# Patient Record
Sex: MALE | Race: WHITE | NOT HISPANIC OR LATINO | Employment: OTHER | URBAN - NONMETROPOLITAN AREA
[De-identification: names, ages, dates, MRNs, and addresses within clinical notes are randomized per-mention and may not be internally consistent; named-entity substitution may affect disease eponyms.]

---

## 2023-08-17 PROBLEM — E55.9 VITAMIN D DEFICIENCY: Status: ACTIVE | Noted: 2023-08-17

## 2023-08-17 PROBLEM — J01.00 ACUTE MAXILLARY SINUSITIS: Status: ACTIVE | Noted: 2023-08-17

## 2023-08-17 PROBLEM — I25.10 ARTERIOSCLEROTIC CARDIOVASCULAR DISEASE (ASCVD): Status: ACTIVE | Noted: 2023-08-17

## 2023-08-17 PROBLEM — N63.20 MASS OF LEFT BREAST: Status: ACTIVE | Noted: 2023-08-17

## 2023-08-17 PROBLEM — R53.83 FATIGUE: Status: ACTIVE | Noted: 2023-08-17

## 2023-08-17 PROBLEM — D24.2 BENIGN NEOPLASM BREAST, LEFT: Status: ACTIVE | Noted: 2023-08-17

## 2023-08-17 PROBLEM — R25.1 TREMOR: Status: ACTIVE | Noted: 2023-08-17

## 2023-08-17 PROBLEM — R22.1 MASS OF NECK: Status: ACTIVE | Noted: 2023-08-17

## 2023-08-17 PROBLEM — N52.9 ERECTILE DYSFUNCTION: Status: ACTIVE | Noted: 2023-08-17

## 2023-08-17 PROBLEM — D17.9 LIPOMA: Status: ACTIVE | Noted: 2023-08-17

## 2023-08-17 PROBLEM — K21.9 GERD (GASTROESOPHAGEAL REFLUX DISEASE): Status: ACTIVE | Noted: 2023-08-17

## 2023-08-17 PROBLEM — E66.3 OVER WEIGHT: Status: ACTIVE | Noted: 2023-08-17

## 2023-08-17 PROBLEM — E04.1 THYROID NODULE: Status: ACTIVE | Noted: 2023-08-17

## 2023-08-17 PROBLEM — R42 DIZZINESS: Status: ACTIVE | Noted: 2023-08-17

## 2023-08-17 PROBLEM — J02.9 SORE THROAT: Status: ACTIVE | Noted: 2023-08-17

## 2023-08-17 PROBLEM — I10 HYPERTENSION: Status: ACTIVE | Noted: 2023-08-17

## 2023-08-17 PROBLEM — E78.5 DYSLIPIDEMIA: Status: ACTIVE | Noted: 2023-08-17

## 2023-08-17 RX ORDER — ACETAMINOPHEN AND PHENYLEPHRINE HCL 325; 5 MG/1; MG/1
TABLET ORAL
COMMUNITY
Start: 2022-02-17 | End: 2023-08-28 | Stop reason: SDUPTHER

## 2023-08-17 RX ORDER — RIZATRIPTAN BENZOATE 10 MG/1
TABLET ORAL
COMMUNITY
Start: 2022-10-04

## 2023-08-17 RX ORDER — ERGOCALCIFEROL 1.25 MG/1
1 CAPSULE ORAL
COMMUNITY
Start: 2022-02-17

## 2023-08-17 RX ORDER — EVOLOCUMAB 140 MG/ML
INJECTION, SOLUTION SUBCUTANEOUS
COMMUNITY
Start: 2020-02-25

## 2023-08-17 RX ORDER — MECLIZINE HCL 12.5 MG 12.5 MG/1
1 TABLET ORAL 3 TIMES DAILY PRN
COMMUNITY
Start: 2022-08-15 | End: 2023-08-28 | Stop reason: SDUPTHER

## 2023-08-17 RX ORDER — ATORVASTATIN CALCIUM 40 MG/1
1 TABLET, FILM COATED ORAL NIGHTLY
COMMUNITY

## 2023-08-17 RX ORDER — LISINOPRIL AND HYDROCHLOROTHIAZIDE 20; 25 MG/1; MG/1
1 TABLET ORAL DAILY
COMMUNITY
End: 2023-08-28 | Stop reason: SDUPTHER

## 2023-08-17 RX ORDER — TADALAFIL 20 MG/1
TABLET ORAL
COMMUNITY
Start: 2020-01-02

## 2023-08-17 RX ORDER — ESOMEPRAZOLE MAGNESIUM 40 MG/1
1 CAPSULE, DELAYED RELEASE ORAL DAILY
COMMUNITY
End: 2023-08-28 | Stop reason: SDUPTHER

## 2023-08-28 ENCOUNTER — OFFICE VISIT (OUTPATIENT)
Dept: PRIMARY CARE | Facility: CLINIC | Age: 68
End: 2023-08-28
Payer: MEDICARE

## 2023-08-28 VITALS
SYSTOLIC BLOOD PRESSURE: 167 MMHG | BODY MASS INDEX: 27.77 KG/M2 | HEIGHT: 70 IN | WEIGHT: 194 LBS | HEART RATE: 74 BPM | DIASTOLIC BLOOD PRESSURE: 95 MMHG | OXYGEN SATURATION: 96 %

## 2023-08-28 DIAGNOSIS — E78.5 HYPERLIPIDEMIA, UNSPECIFIED HYPERLIPIDEMIA TYPE: ICD-10-CM

## 2023-08-28 DIAGNOSIS — N63.20 MASS OF LEFT BREAST, UNSPECIFIED QUADRANT: ICD-10-CM

## 2023-08-28 DIAGNOSIS — Z00.00 MEDICARE ANNUAL WELLNESS VISIT, SUBSEQUENT: Primary | ICD-10-CM

## 2023-08-28 DIAGNOSIS — K21.00 GASTROESOPHAGEAL REFLUX DISEASE WITH ESOPHAGITIS, UNSPECIFIED WHETHER HEMORRHAGE: ICD-10-CM

## 2023-08-28 DIAGNOSIS — I10 HYPERTENSION, UNSPECIFIED TYPE: ICD-10-CM

## 2023-08-28 DIAGNOSIS — R42 DIZZINESS: ICD-10-CM

## 2023-08-28 DIAGNOSIS — Z00.00 HEALTHCARE MAINTENANCE: ICD-10-CM

## 2023-08-28 DIAGNOSIS — G47.9 SLEEP DISTURBANCE: ICD-10-CM

## 2023-08-28 DIAGNOSIS — R20.0 RIGHT ARM NUMBNESS: ICD-10-CM

## 2023-08-28 DIAGNOSIS — D64.9 ANEMIA, UNSPECIFIED TYPE: ICD-10-CM

## 2023-08-28 DIAGNOSIS — R53.83 FATIGUE, UNSPECIFIED TYPE: ICD-10-CM

## 2023-08-28 DIAGNOSIS — E78.5 DYSLIPIDEMIA: ICD-10-CM

## 2023-08-28 DIAGNOSIS — D17.9 LIPOMA, UNSPECIFIED SITE: ICD-10-CM

## 2023-08-28 PROCEDURE — 99214 OFFICE O/P EST MOD 30 MIN: CPT | Performed by: INTERNAL MEDICINE

## 2023-08-28 PROCEDURE — 3080F DIAST BP >= 90 MM HG: CPT | Performed by: INTERNAL MEDICINE

## 2023-08-28 PROCEDURE — G0439 PPPS, SUBSEQ VISIT: HCPCS | Performed by: INTERNAL MEDICINE

## 2023-08-28 PROCEDURE — 1036F TOBACCO NON-USER: CPT | Performed by: INTERNAL MEDICINE

## 2023-08-28 PROCEDURE — 1159F MED LIST DOCD IN RCRD: CPT | Performed by: INTERNAL MEDICINE

## 2023-08-28 PROCEDURE — 3077F SYST BP >= 140 MM HG: CPT | Performed by: INTERNAL MEDICINE

## 2023-08-28 PROCEDURE — 1170F FXNL STATUS ASSESSED: CPT | Performed by: INTERNAL MEDICINE

## 2023-08-28 PROCEDURE — 1160F RVW MEDS BY RX/DR IN RCRD: CPT | Performed by: INTERNAL MEDICINE

## 2023-08-28 RX ORDER — ESOMEPRAZOLE MAGNESIUM 40 MG/1
40 CAPSULE, DELAYED RELEASE ORAL DAILY
Qty: 90 CAPSULE | Refills: 1 | Status: SHIPPED | OUTPATIENT
Start: 2023-08-28 | End: 2024-02-19

## 2023-08-28 RX ORDER — ACETAMINOPHEN AND PHENYLEPHRINE HCL 325; 5 MG/1; MG/1
1 TABLET ORAL DAILY
Qty: 90 TABLET | Refills: 1 | Status: SHIPPED | OUTPATIENT
Start: 2023-08-28

## 2023-08-28 RX ORDER — ACETAMINOPHEN 500 MG
1 TABLET ORAL 3 TIMES WEEKLY
Qty: 1 EACH | Refills: 0 | Status: SHIPPED | OUTPATIENT
Start: 2023-08-28

## 2023-08-28 RX ORDER — MECLIZINE HCL 12.5 MG 12.5 MG/1
12.5 TABLET ORAL 3 TIMES DAILY PRN
Qty: 30 TABLET | Refills: 0 | Status: SHIPPED | OUTPATIENT
Start: 2023-08-28 | End: 2023-10-18

## 2023-08-28 RX ORDER — LISINOPRIL AND HYDROCHLOROTHIAZIDE 20; 25 MG/1; MG/1
1 TABLET ORAL DAILY
Qty: 90 TABLET | Refills: 1 | Status: SHIPPED | OUTPATIENT
Start: 2023-08-28 | End: 2024-02-19

## 2023-08-28 ASSESSMENT — ACTIVITIES OF DAILY LIVING (ADL)
BATHING: INDEPENDENT
GROCERY_SHOPPING: INDEPENDENT
DOING_HOUSEWORK: INDEPENDENT
TAKING_MEDICATION: INDEPENDENT
MANAGING_FINANCES: INDEPENDENT
DRESSING: INDEPENDENT

## 2023-08-28 ASSESSMENT — PATIENT HEALTH QUESTIONNAIRE - PHQ9
SUM OF ALL RESPONSES TO PHQ9 QUESTIONS 1 AND 2: 0
1. LITTLE INTEREST OR PLEASURE IN DOING THINGS: NOT AT ALL
2. FEELING DOWN, DEPRESSED OR HOPELESS: NOT AT ALL

## 2023-08-28 NOTE — PROGRESS NOTES
"Patient ID: He states that his right hand sometimes goes numb during the day. He states it only happens to right arm, feels like his \"arm is asleep\". His wife states that he yells, jerks, screams, snores in his sleep. He states that he is pretty active, likes to fish. He states that he had cardiac catheterization in 2020, no stents were needed. He denies any chest pain or SOB.     CHELY Bran is a 68 y.o. male with PMH remarkable for HTN, dyslipidemia, GERD, who presents to the office today to establish care.     HEALTH MAINTENANCE: Establish Care  Previous PCP: Dr. Goldberg  Smoking: former  Labs: 2/12/22  PSA: 2/12/22  Colonoscopy (45-75): 11/01/2018 polys removed with following results: A. Sigmoid Colon, Biopsy - Small inflammatory polyp with mild hyperplastic changes. B. Rectum, Biopsy - Small hyperplastic polyp. C. Duodenum, Biopsy - Mild chronic duodenitis. D. Stomach, Biopsy - Fundal and antral mucosa with mild reactive gastropathy. E. Esophagus, Biopsy - Gastric cardiac type mucosa with chronic inflammation, no squamous mucosa present, negative for intestinal metaplasia.   Lung cancer screening (55-80 + 30 pack year + smoking/quit in last 15 years): quit > 15 years ago  DEXA (65+, q 2 years):   2D Echo on 1/29/20 revealed: 1. The left ventricular systolic function is normal with a 60-65% estimated ejection fraction. 2. Spectral Doppler shows an impaired relaxation pattern of left ventricular diastolic filling.     SOCIAL HISTORY:  Social History     Socioeconomic History    Marital status:      Spouse name: Not on file    Number of children: Not on file    Years of education: Not on file    Highest education level: Not on file   Occupational History    Not on file   Tobacco Use    Smoking status: Never    Smokeless tobacco: Never   Substance and Sexual Activity    Alcohol use: Never    Drug use: Never    Sexual activity: Not on file   Other Topics Concern    Not on file   Social History " "Narrative    Not on file     Social Determinants of Health     Financial Resource Strain: Not on file   Food Insecurity: Not on file   Transportation Needs: Not on file   Physical Activity: Not on file   Stress: Not on file   Social Connections: Not on file   Intimate Partner Violence: Not on file   Housing Stability: Not on file     IMMUNIZATIONS:  Immunization History   Administered Date(s) Administered    Influenza, seasonal, injectable 10/14/2014    Pfizer Purple Cap SARS-CoV-2 03/11/2021, 04/01/2021, 11/26/2021    Pneumococcal polysaccharide vaccine, 23-valent, age 2 years and older (PNEUMOVAX 23) 10/14/2014     REVIEW OF SYSTEMS:  Review of Systems   Neurological:  Positive for numbness.   Psychiatric/Behavioral:  Positive for sleep disturbance.      ALLERGIES:  No Known Allergies    VITAL SIGNS:  Vitals:    08/28/23 1317   BP: (!) 167/95   BP Location: Right arm   Pulse: 74   SpO2: 96%   Weight: 88 kg (194 lb)   Height: 1.778 m (5' 10\")     Physical Exam  Vitals reviewed.   Constitutional:       Appearance: Normal appearance.   HENT:      Head: Normocephalic and atraumatic.   Eyes:      Extraocular Movements: Extraocular movements intact.      Pupils: Pupils are equal, round, and reactive to light.   Cardiovascular:      Rate and Rhythm: Normal rate and regular rhythm.      Pulses: Normal pulses.      Heart sounds: Normal heart sounds.   Pulmonary:      Effort: Pulmonary effort is normal.      Breath sounds: Normal breath sounds.   Abdominal:      General: Bowel sounds are normal.      Palpations: Abdomen is soft.   Musculoskeletal:         General: Normal range of motion.      Cervical back: Normal range of motion and neck supple.   Skin:     General: Skin is warm and dry.   Neurological:      General: No focal deficit present.      Mental Status: He is alert and oriented to person, place, and time.   Psychiatric:         Mood and Affect: Mood normal.         Behavior: Behavior normal. "     MEDICATIONS:  Current Outpatient Medications on File Prior to Visit   Medication Sig Dispense Refill    atorvastatin (Lipitor) 40 mg tablet Take 1 tablet (40 mg) by mouth once daily at bedtime.      cyanocobalamin, vitamin B-12, 500 mcg tablet,chewable Chew.      ergocalciferol (Vitamin D-2) 1.25 MG (91164 UT) capsule Take 1 capsule (1,250 mcg) by mouth 1 (one) time per week.      esomeprazole (NexIUM) 40 mg DR capsule Take 1 capsule (40 mg) by mouth once daily.      lisinopriL-hydrochlorothiazide 20-25 mg tablet Take 1 tablet by mouth once daily.      meclizine (Antivert) 12.5 mg tablet Take 1 tablet (12.5 mg) by mouth 3 times a day as needed.      Repatha SureClick 140 mg/mL injection Inject under the skin.      rizatriptan (Maxalt) 10 mg tablet       tadalafil 20 mg tablet Take by mouth.       No current facility-administered medications on file prior to visit.      LABORATORY DATA:  Lab Results   Component Value Date    WBC 10.4 02/12/2022    HGB 14.4 02/12/2022    HCT 43.9 02/12/2022     02/12/2022    CHOL 158 02/12/2022    TRIG 362 (H) 02/12/2022    HDL 32.0 (A) 02/12/2022    ALT 19 02/12/2022    AST 14 02/12/2022     02/12/2022    K 3.9 02/12/2022     02/12/2022    CREATININE 1.23 02/12/2022    BUN 16 02/12/2022    CO2 29 02/12/2022    TSH 1.82 02/12/2022    INR 1.0 01/28/2020     ASSESSMENT AND PLAN:  Healthcare Maintenance: Establish care, former patient of Dr. Goldberg  - he has not had bloodwork in over one year, orders inputted for annual fasting bloodwork  - reviewed most recent colonoscopy results from 2018  - he had recent mammogram due to lump on left breast in March of this year, followed up with Dr. Harris who removed lump, was found to be benign lipoma  - Follow up in four months    HTN  - his BP is elevated today 167/92  - he admits he stopped taking his BP medication  - advised to resume Lisinopril-hydrochlorothiazide  - monitor BP at home 3X/week and call office with  "results after 3 weeks, BP cuff prescription sent     Dizziness  - he had MRI of brain on 9/9/22 due to dizziness, with following results: There is no evidence of mass, infarction or hemorrhage.  - continue with Meclizine    HLD  - advised to continue with atorvastatin  - will check lipid panel    GERD  - c/w PPI    Difficulty sleeping  - he reports he feels as if someone is \"holding him down\" or that he is paralyzed while sleeping and he can't move, then yells out for his wife  - his wife reports he talks, yells out, snores, moves all extremities during sleep  - will order inpatient sleep study for further evaluation    Right arm numbness most likely 2/2 carpal tunnel  - he reports it occurs during the day during activity, only in right arm  - can refer to hand specialist if symptoms worsen or patient wishes      --------------------  Written by Jessa Al LPN, acting as a scribe for Dr. Chacko. This note accurately reflects the work and decisions made by Dr. Chacko.     I, Dr. Chacko, attest all medical record entries made by the scribe were under my direction and were personally dictated by me. I have reviewed the chart and agree that the record accurately reflects my performance of the history, physical exam, and assessment and plan.     "

## 2023-08-29 ASSESSMENT — ENCOUNTER SYMPTOMS
SLEEP DISTURBANCE: 1
NUMBNESS: 1

## 2023-08-29 NOTE — PATIENT INSTRUCTIONS
It was great to see you in the office today! Here is what we discussed at your visit today:  Please continue to take your current medications   Please get bloodwork drawn as soon as you are able. We will call you with results.  We have inputted referral for sleep study, please schedule this as soon as possible  Monitor your BP at home 2-3 times per week and call us with results after 3 weeks, we sent in prescription for BP cuff  Follow up in four months

## 2023-10-11 ENCOUNTER — PHARMACY VISIT (OUTPATIENT)
Dept: PHARMACY | Facility: CLINIC | Age: 68
End: 2023-10-11
Payer: COMMERCIAL

## 2023-10-16 DIAGNOSIS — R42 DIZZINESS: ICD-10-CM

## 2023-10-18 RX ORDER — MECLIZINE HCL 12.5 MG 12.5 MG/1
12.5 TABLET ORAL 3 TIMES DAILY PRN
Qty: 30 TABLET | Refills: 0 | Status: SHIPPED | OUTPATIENT
Start: 2023-10-18

## 2023-12-04 ENCOUNTER — PHARMACY VISIT (OUTPATIENT)
Dept: PHARMACY | Facility: CLINIC | Age: 68
End: 2023-12-04
Payer: COMMERCIAL

## 2023-12-04 PROCEDURE — RXMED WILLOW AMBULATORY MEDICATION CHARGE

## 2023-12-27 ENCOUNTER — TELEPHONE (OUTPATIENT)
Dept: PRIMARY CARE | Facility: CLINIC | Age: 68
End: 2023-12-27
Payer: MEDICARE

## 2023-12-27 DIAGNOSIS — U07.1 COVID: Primary | ICD-10-CM

## 2023-12-27 PROCEDURE — RXMED WILLOW AMBULATORY MEDICATION CHARGE

## 2023-12-27 RX ORDER — NIRMATRELVIR AND RITONAVIR 300-100 MG
3 KIT ORAL 2 TIMES DAILY
Qty: 30 TABLET | Refills: 0 | Status: SHIPPED | OUTPATIENT
Start: 2023-12-27 | End: 2024-01-01

## 2023-12-27 RX ORDER — BENZONATATE 100 MG/1
100 CAPSULE ORAL 3 TIMES DAILY PRN
Qty: 42 CAPSULE | Refills: 0 | Status: SHIPPED | OUTPATIENT
Start: 2023-12-27 | End: 2024-01-26

## 2023-12-27 NOTE — TELEPHONE ENCOUNTER
Patient tested positive for covid today. Has had sore throat, cough, runny nose, body aches, chills. Is there anything you can give? (Dr. Chacko patient)

## 2023-12-28 ENCOUNTER — PHARMACY VISIT (OUTPATIENT)
Dept: PHARMACY | Facility: CLINIC | Age: 68
End: 2023-12-28
Payer: COMMERCIAL

## 2024-07-11 PROBLEM — K43.9 ABDOMINAL WALL HERNIA: Status: ACTIVE | Noted: 2018-09-28

## 2024-07-11 PROBLEM — R73.01 ELEVATED FASTING BLOOD SUGAR: Status: ACTIVE | Noted: 2018-10-08

## 2024-07-11 PROBLEM — E29.1 HYPOGONADISM IN MALE: Status: ACTIVE | Noted: 2018-10-08

## 2024-07-11 PROBLEM — R51.9 HEADACHE: Status: ACTIVE | Noted: 2019-01-18

## 2024-07-11 PROBLEM — K59.1 FUNCTIONAL DIARRHEA: Status: ACTIVE | Noted: 2018-09-28

## 2024-07-12 NOTE — PROGRESS NOTES
Patient ID:   Right shoulder pain, shoots down to elbow. No falls/injuries. No activities that would cause injury or pain. 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 Increased BP in office today. Check BP 3 times a week for 3 weeks call us with results.    HEALTH MAINTENANCE: Follow Up  Last Office Visit: 8/28/2023 HTN, patient had stopped BP medication, advised to restart lisinopril-hydrochlorothiazide, monitor BP at home and call with results, never called results in. Difficulty sleeping, ordered sleep study, never done.  Last Labs: 8/31/23  PSA Screening (starting at age 55 till 69): 2/12/22 normal  Colonoscopy (45-75 or age 40 with 1st degree relative dx colon ca): 11/01/2018 polys removed with following results: A. Sigmoid Colon, Biopsy - Small inflammatory polyp with mild hyperplastic changes. B. Rectum, Biopsy - Small hyperplastic polyp. C. Duodenum, Biopsy - Mild chronic duodenitis. D. Stomach, Biopsy - Fundal and antral mucosa with mild reactive gastropathy. E. Esophagus, Biopsy - Gastric cardiac type mucosa with chronic inflammation, no squamous mucosa present, negative for intestinal metaplasia.   Lung cancer screening (50-78 y/o + 20 pack year + smoking/quit in last 15 years): quit > 15 years ago  DEXA (65+, q 2 years women and 70+ men):     HPI Conor Bran is a 68 y.o. male with PMH remarkable for HTN, dyslipidemia, GERD who presents to the office today for shoulder pain, HTN.    Social History     Tobacco Use    Smoking status: Never    Smokeless tobacco: Never   Substance Use Topics    Alcohol use: Never    Drug use: Never       Review of Systems   Constitutional: Negative.    HENT: Negative.     Eyes: Negative.    Respiratory: Negative.     Cardiovascular: Negative.    Gastrointestinal: Negative.    Endocrine: Negative.    Genitourinary: Negative.    Musculoskeletal: Negative.    Skin: Negative.    Allergic/Immunologic: Negative.    Neurological: Negative.    Hematological:  "Negative.    Psychiatric/Behavioral: Negative.         Visit Vitals  BP (!) 183/113 (BP Location: Left arm)   Pulse 92   Resp 16   Ht 1.778 m (5' 10\")   Wt 89.1 kg (196 lb 8 oz)   SpO2 98%   BMI 28.19 kg/m²   Smoking Status Never   BSA 2.1 m²       Physical Exam  Vitals reviewed.   Constitutional:       Appearance: Normal appearance. He is normal weight.   Cardiovascular:      Rate and Rhythm: Normal rate and regular rhythm.      Heart sounds: Normal heart sounds.   Pulmonary:      Effort: Pulmonary effort is normal.      Breath sounds: Normal breath sounds and air entry.   Musculoskeletal:      Right shoulder: Tenderness present. Decreased range of motion.      Cervical back: Full passive range of motion without pain.      Comments: Right shoulder tenderness posterior aspect of shoulder, shoulder abduction pain started at 100 degrees. Weakness, no resistance to free drop of arm.   Neurological:      Mental Status: He is alert and oriented to person, place, and time.   Psychiatric:         Attention and Perception: Attention and perception normal.         Mood and Affect: Mood and affect normal.         Speech: Speech normal.         Behavior: Behavior normal. Behavior is cooperative.         Thought Content: Thought content normal.         Cognition and Memory: Cognition and memory normal.         Judgment: Judgment normal.         Current Outpatient Medications   Medication Instructions    atorvastatin (Lipitor) 40 mg tablet 1 tablet, oral, Nightly    blood pressure test kit-large kit 1 each, miscellaneous, 3 times weekly    cyanocobalamin, vitamin B-12, 500 mcg tablet,chewable 1 tablet, oral, Daily    ergocalciferol (Vitamin D-2) 1.25 MG (79433 UT) capsule 1 capsule, oral, Once Weekly    esomeprazole (NEXIUM) 40 mg, oral, Daily    lisinopriL-hydrochlorothiazide 20-25 mg tablet 1 tablet, oral, Daily    meclizine (ANTIVERT) 12.5 mg, oral, 3 times daily PRN    Repatha SureClick 140 mg/mL injection subcutaneous    " rizatriptan (Maxalt) 10 mg tablet     tadalafil 20 mg tablet oral        Lab Results   Component Value Date    WBC 8.2 08/31/2023    HGB 14.3 08/31/2023    HCT 42.5 08/31/2023     08/31/2023    CHOL 158 02/12/2022    TRIG 362 (H) 02/12/2022    HDL 32.0 (A) 02/12/2022    ALT 17 08/31/2023    AST 13 08/31/2023     08/31/2023    K 3.9 08/31/2023     08/31/2023    CREATININE 1.09 08/31/2023    BUN 19 08/31/2023    CO2 28 08/31/2023    TSH 1.82 02/12/2022    INR 1.0 01/28/2020       Problem List Items Addressed This Visit             ICD-10-CM    Hypertension I10     -amlodipine 5mg PO daily  -check BP 3 times a week for 3 weeks and call us with results           Relevant Medications    amLODIPine (Norvasc) 5 mg tablet    predniSONE (Deltasone) 20 mg tablet    celecoxib (CeleBREX) 200 mg capsule    Acute pain of right shoulder M25.511     -Xray ordered  -referral to Physical Therapy  -celebrex 200mg PO daily  -prednisone 40mg PO daily in the morning         Relevant Orders    XR shoulder right 2+ views    Referral to Physical Therapy       --------------------  Written by Angelika Galo, acting as a scribe for Dr. Chacko. This note accurately reflects the work and decisions made by Dr. Chacko.     I, Dr. Chacko, attest all medical record entries made by the scribe were under my direction and were personally dictated by me. I have reviewed the chart and agree that the record accurately reflects my performance of the history, physical exam, and assessment and plan.

## 2024-07-15 ENCOUNTER — HOSPITAL ENCOUNTER (OUTPATIENT)
Dept: RADIOLOGY | Facility: CLINIC | Age: 69
Discharge: HOME | End: 2024-07-15
Payer: MEDICARE

## 2024-07-15 ENCOUNTER — APPOINTMENT (OUTPATIENT)
Dept: PRIMARY CARE | Facility: CLINIC | Age: 69
End: 2024-07-15
Payer: MEDICARE

## 2024-07-15 VITALS
HEIGHT: 70 IN | SYSTOLIC BLOOD PRESSURE: 183 MMHG | HEART RATE: 92 BPM | DIASTOLIC BLOOD PRESSURE: 113 MMHG | BODY MASS INDEX: 28.13 KG/M2 | RESPIRATION RATE: 16 BRPM | WEIGHT: 196.5 LBS | OXYGEN SATURATION: 98 %

## 2024-07-15 DIAGNOSIS — M25.511 ACUTE PAIN OF RIGHT SHOULDER: ICD-10-CM

## 2024-07-15 DIAGNOSIS — I10 HYPERTENSION, UNSPECIFIED TYPE: ICD-10-CM

## 2024-07-15 PROCEDURE — 1159F MED LIST DOCD IN RCRD: CPT | Performed by: INTERNAL MEDICINE

## 2024-07-15 PROCEDURE — 3080F DIAST BP >= 90 MM HG: CPT | Performed by: INTERNAL MEDICINE

## 2024-07-15 PROCEDURE — 73030 X-RAY EXAM OF SHOULDER: CPT | Mod: RIGHT SIDE | Performed by: RADIOLOGY

## 2024-07-15 PROCEDURE — 3008F BODY MASS INDEX DOCD: CPT | Performed by: INTERNAL MEDICINE

## 2024-07-15 PROCEDURE — 1158F ADVNC CARE PLAN TLK DOCD: CPT | Performed by: INTERNAL MEDICINE

## 2024-07-15 PROCEDURE — 3077F SYST BP >= 140 MM HG: CPT | Performed by: INTERNAL MEDICINE

## 2024-07-15 PROCEDURE — 1036F TOBACCO NON-USER: CPT | Performed by: INTERNAL MEDICINE

## 2024-07-15 PROCEDURE — 1125F AMNT PAIN NOTED PAIN PRSNT: CPT | Performed by: INTERNAL MEDICINE

## 2024-07-15 PROCEDURE — 1160F RVW MEDS BY RX/DR IN RCRD: CPT | Performed by: INTERNAL MEDICINE

## 2024-07-15 PROCEDURE — 99214 OFFICE O/P EST MOD 30 MIN: CPT | Performed by: INTERNAL MEDICINE

## 2024-07-15 PROCEDURE — 73030 X-RAY EXAM OF SHOULDER: CPT | Mod: RT

## 2024-07-15 PROCEDURE — 1123F ACP DISCUSS/DSCN MKR DOCD: CPT | Performed by: INTERNAL MEDICINE

## 2024-07-15 RX ORDER — PREDNISONE 20 MG/1
40 TABLET ORAL DAILY
Qty: 10 TABLET | Refills: 0 | Status: SHIPPED | OUTPATIENT
Start: 2024-07-15 | End: 2024-07-20

## 2024-07-15 RX ORDER — CELECOXIB 200 MG/1
200 CAPSULE ORAL DAILY
Qty: 30 CAPSULE | Refills: 0 | Status: SHIPPED | OUTPATIENT
Start: 2024-07-15 | End: 2025-01-11

## 2024-07-15 RX ORDER — AMLODIPINE BESYLATE 5 MG/1
5 TABLET ORAL DAILY
Qty: 30 TABLET | Refills: 5 | Status: SHIPPED | OUTPATIENT
Start: 2024-07-15 | End: 2025-01-11

## 2024-07-15 ASSESSMENT — ENCOUNTER SYMPTOMS
ENDOCRINE NEGATIVE: 1
EYES NEGATIVE: 1
MUSCULOSKELETAL NEGATIVE: 1
ALLERGIC/IMMUNOLOGIC NEGATIVE: 1
CONSTITUTIONAL NEGATIVE: 1
NEUROLOGICAL NEGATIVE: 1
RESPIRATORY NEGATIVE: 1
GASTROINTESTINAL NEGATIVE: 1
HEMATOLOGIC/LYMPHATIC NEGATIVE: 1
PSYCHIATRIC NEGATIVE: 1
CARDIOVASCULAR NEGATIVE: 1

## 2024-07-15 ASSESSMENT — PATIENT HEALTH QUESTIONNAIRE - PHQ9
2. FEELING DOWN, DEPRESSED OR HOPELESS: NOT AT ALL
1. LITTLE INTEREST OR PLEASURE IN DOING THINGS: NOT AT ALL
SUM OF ALL RESPONSES TO PHQ9 QUESTIONS 1 AND 2: 0

## 2024-07-15 ASSESSMENT — COLUMBIA-SUICIDE SEVERITY RATING SCALE - C-SSRS
2. HAVE YOU ACTUALLY HAD ANY THOUGHTS OF KILLING YOURSELF?: NO
6. HAVE YOU EVER DONE ANYTHING, STARTED TO DO ANYTHING, OR PREPARED TO DO ANYTHING TO END YOUR LIFE?: NO
1. IN THE PAST MONTH, HAVE YOU WISHED YOU WERE DEAD OR WISHED YOU COULD GO TO SLEEP AND NOT WAKE UP?: NO

## 2024-07-15 ASSESSMENT — PAIN SCALES - GENERAL: PAINLEVEL: 3

## 2024-07-15 NOTE — PROGRESS NOTES
Right shoulder pain x 2 months no falls or injuries. States at times he has to use his left hand to lift his right arm up    Intermittent Sharp pains in feet x 3 months    BP is elevated denies any symptoms

## 2024-07-15 NOTE — PATIENT INSTRUCTIONS
It was great to see you in the office today! Here is what we discussed at your visit today:    Monitor your BP at home 2-3 times per week and call us with results after 3 weeks  -wait 3 days after starting medication before starting to monitor your BP  3 medications have been called into [facility] in [place], please start taking these medications as soon as you can.

## 2024-07-15 NOTE — ASSESSMENT & PLAN NOTE
-Xray ordered  -referral to Physical Therapy  -celebrex 200mg PO daily  -prednisone 40mg PO daily in the morning

## 2024-08-11 DIAGNOSIS — K21.00 GASTROESOPHAGEAL REFLUX DISEASE WITH ESOPHAGITIS, UNSPECIFIED WHETHER HEMORRHAGE: ICD-10-CM

## 2024-08-11 DIAGNOSIS — I10 HYPERTENSION, UNSPECIFIED TYPE: ICD-10-CM

## 2024-08-12 RX ORDER — CELECOXIB 200 MG/1
200 CAPSULE ORAL DAILY
Qty: 30 CAPSULE | Refills: 0 | Status: SHIPPED | OUTPATIENT
Start: 2024-08-12 | End: 2025-02-08

## 2024-08-12 RX ORDER — ESOMEPRAZOLE MAGNESIUM 40 MG/1
40 CAPSULE, DELAYED RELEASE ORAL DAILY
Qty: 90 CAPSULE | Refills: 1 | Status: SHIPPED | OUTPATIENT
Start: 2024-08-12

## 2024-09-08 DIAGNOSIS — I10 HYPERTENSION, UNSPECIFIED TYPE: ICD-10-CM

## 2024-09-09 RX ORDER — CELECOXIB 200 MG/1
200 CAPSULE ORAL DAILY
Qty: 30 CAPSULE | Refills: 0 | Status: SHIPPED | OUTPATIENT
Start: 2024-09-09 | End: 2025-03-08

## 2024-10-07 DIAGNOSIS — I10 HYPERTENSION, UNSPECIFIED TYPE: ICD-10-CM

## 2024-10-07 RX ORDER — CELECOXIB 200 MG/1
200 CAPSULE ORAL DAILY
Qty: 30 CAPSULE | Refills: 1 | Status: SHIPPED | OUTPATIENT
Start: 2024-10-07 | End: 2025-04-05

## 2024-11-07 DIAGNOSIS — I10 HYPERTENSION, UNSPECIFIED TYPE: ICD-10-CM

## 2024-11-08 RX ORDER — AMLODIPINE BESYLATE 5 MG/1
5 TABLET ORAL DAILY
Qty: 90 TABLET | Refills: 1 | Status: SHIPPED | OUTPATIENT
Start: 2024-11-08

## 2024-12-07 DIAGNOSIS — I10 HYPERTENSION, UNSPECIFIED TYPE: ICD-10-CM

## 2024-12-09 RX ORDER — CELECOXIB 200 MG/1
200 CAPSULE ORAL DAILY
Qty: 30 CAPSULE | Refills: 1 | Status: SHIPPED | OUTPATIENT
Start: 2024-12-09 | End: 2025-06-07

## 2025-01-13 ENCOUNTER — APPOINTMENT (OUTPATIENT)
Dept: PRIMARY CARE | Facility: CLINIC | Age: 70
End: 2025-01-13
Payer: MEDICARE

## 2025-01-27 DIAGNOSIS — U07.1 COVID: Primary | ICD-10-CM

## 2025-02-05 DIAGNOSIS — I10 HYPERTENSION, UNSPECIFIED TYPE: ICD-10-CM

## 2025-02-05 DIAGNOSIS — K21.00 GASTROESOPHAGEAL REFLUX DISEASE WITH ESOPHAGITIS, UNSPECIFIED WHETHER HEMORRHAGE: ICD-10-CM

## 2025-02-05 RX ORDER — CELECOXIB 200 MG/1
200 CAPSULE ORAL DAILY
Qty: 30 CAPSULE | Refills: 1 | Status: SHIPPED | OUTPATIENT
Start: 2025-02-05 | End: 2025-08-04

## 2025-02-05 RX ORDER — ESOMEPRAZOLE MAGNESIUM 40 MG/1
40 CAPSULE, DELAYED RELEASE ORAL DAILY
Qty: 90 CAPSULE | Refills: 1 | Status: SHIPPED | OUTPATIENT
Start: 2025-02-05

## 2025-05-09 DIAGNOSIS — I10 HYPERTENSION, UNSPECIFIED TYPE: ICD-10-CM

## 2025-05-09 RX ORDER — AMLODIPINE BESYLATE 5 MG/1
5 TABLET ORAL DAILY
Qty: 90 TABLET | Refills: 1 | Status: SHIPPED | OUTPATIENT
Start: 2025-05-09